# Patient Record
Sex: MALE | Race: WHITE | URBAN - METROPOLITAN AREA
[De-identification: names, ages, dates, MRNs, and addresses within clinical notes are randomized per-mention and may not be internally consistent; named-entity substitution may affect disease eponyms.]

---

## 2020-07-06 ENCOUNTER — OFFICE VISIT (OUTPATIENT)
Dept: ORTHOPEDIC SURGERY | Age: 50
End: 2020-07-06
Payer: COMMERCIAL

## 2020-07-06 VITALS — HEIGHT: 72 IN | BODY MASS INDEX: 29.8 KG/M2 | WEIGHT: 220 LBS | TEMPERATURE: 97.9 F

## 2020-07-06 PROCEDURE — 26720 TREAT FINGER FRACTURE EACH: CPT | Performed by: ORTHOPAEDIC SURGERY

## 2020-07-06 PROCEDURE — 99243 OFF/OP CNSLTJ NEW/EST LOW 30: CPT | Performed by: ORTHOPAEDIC SURGERY

## 2020-07-06 NOTE — LETTER
CAST CARE INSTRUCTIONS    * Elevate your injured limb to reduce swelling. Elevate above heart level. Exercise fingers and toes frequently. * Do NOT stick anything into your cast to scratch. Use of a  or sharp object to scratch under the cast can be harmful and irritating to the skin. This can cause an infection with long-term problems. *Keep cast as clean and dry as possible. If you get your cast wet, try to dry it out with a hair dryer on a cool, low setting. If you cannot get cast dry, contact the office. Casts are never completely comfortable and some pain and swelling are common. You Should contact the office if you experience:   Extreme/ worsening pain. Numbness or tingling. New, increased tightness. Swollen, blue or cold fingers or toes. Rubbing from the cast that persists and causes pain. *If you become concerned that cast feels too tight. Please check your capillary refill. To do this, squeeze one of your fingers or toes on injured limb. Under the fingernail will temporarily look pale in color, but the pink comes back as the blood refills the capillaries. We like for this to happen in two seconds or less. If it takes longer, this is a sign that the cast is too tight. If this happens during business hours, call our office. If it happens overnight or on the weekend, please seek treatment at an Emergency Room. *

## 2020-07-06 NOTE — PROGRESS NOTES
thumb spica cast is applied, over adequate padding and is well molded for exact fit. AP and lateral Xrays of the left thumb done in the office today, in the cast, demonstrate satisfactory fracture position. The patient is carefully instructed regarding cast care, elevation, exercises, activity restrictions/precautions and pain control. All questions are answered. A return appoinment is made for 2 1/2 weeks for  cast removal, brace application and X-rays. The patient is asked  to call me sooner if there are any questions or if severe pain or swelling occurs.

## 2020-07-23 ENCOUNTER — OFFICE VISIT (OUTPATIENT)
Dept: ORTHOPEDIC SURGERY | Age: 50
End: 2020-07-23
Payer: COMMERCIAL

## 2020-07-23 VITALS — WEIGHT: 220 LBS | TEMPERATURE: 97.7 F | HEIGHT: 72 IN | BODY MASS INDEX: 29.8 KG/M2

## 2020-07-23 PROCEDURE — 99024 POSTOP FOLLOW-UP VISIT: CPT | Performed by: ORTHOPAEDIC SURGERY

## 2020-07-23 PROCEDURE — L3809 WHFO W/O JOINTS PRE OTS: HCPCS | Performed by: ORTHOPAEDIC SURGERY

## 2020-07-23 SDOH — HEALTH STABILITY: MENTAL HEALTH: HOW OFTEN DO YOU HAVE A DRINK CONTAINING ALCOHOL?: NEVER

## 2020-08-06 ENCOUNTER — OFFICE VISIT (OUTPATIENT)
Dept: ORTHOPEDIC SURGERY | Age: 50
End: 2020-08-06

## 2020-08-06 VITALS — TEMPERATURE: 98.1 F | HEIGHT: 72 IN | BODY MASS INDEX: 29.8 KG/M2 | WEIGHT: 220 LBS

## 2020-08-06 PROCEDURE — 99024 POSTOP FOLLOW-UP VISIT: CPT | Performed by: ORTHOPAEDIC SURGERY

## 2020-08-06 NOTE — PROGRESS NOTES
Patient returns to the office for evaluation of   Chief Complaint   Patient presents with    Follow-up     Closed nondisplaced fracture of proximal phalanx of left thumb     The patient has had no significant problems and voices no complaints. The patient's social history, past medical history, family history, medications, allergies and review of systems have been reviewed, dated 7/6/20 and are recorded in the chart. On examination there is less swelling, no visible deformity and no tenderness at the fracture site. Range of motion is improved. AP and lateral Xrays of the left thumb done in the office today, demonstrate slow progress towards healing. There is no loss of fracture position. The patient is advised regarding his slow progress, which is likely related to his smoking habit. He is advised to cut down or cease smoking. He is instructed about activity precautions and a range of motion exercise program, which is fully discussed and demonstrated. The usual course of events in the resolution of the symptoms associated with this condition is fully discussed with the patient. As long as they progress as expected, they do not need to return for further follow up. They are, however, urged to call or return if they have questions or concerns.